# Patient Record
Sex: MALE | Race: WHITE | Employment: STUDENT
[De-identification: names, ages, dates, MRNs, and addresses within clinical notes are randomized per-mention and may not be internally consistent; named-entity substitution may affect disease eponyms.]

---

## 2024-07-02 ENCOUNTER — TELEPHONE (OUTPATIENT)
Facility: HOSPITAL | Age: 59
End: 2024-07-02

## 2024-07-03 ENCOUNTER — HOSPITAL ENCOUNTER (OUTPATIENT)
Facility: HOSPITAL | Age: 59
Setting detail: RECURRING SERIES
Discharge: HOME | End: 2024-07-06
Payer: COMMERCIAL

## 2024-07-03 PROCEDURE — 97161 PT EVAL LOW COMPLEX 20 MIN: CPT

## 2024-07-03 NOTE — PROGRESS NOTES
[facility] at [facility]  [address] [address]  Phone: [phone]   Fax: [phone]    Plan of Care/ Statement of Necessity for Physical Therapy Services    Patient name: Francesco Wren Start of Care: 7/3/2024   Referral source: Luciano Hurd MD : 1965    Medical Diagnosis: Cervicalgia [M54.2]      Onset Date:2024    Treatment Diagnosis:  M54.2  NECK PAIN                                          Prior Hospitalization: see medical history Provider#: [id number]   Medications: Verified on Patient Summary List     Comorbidities: cardiac sarcoidosis, heart disease. Surgical Hx: right knee x3, left knee x3, right reconstruction, left frozen shoulder manipulation.    PLOF: resistance exercise routine, basketball, independent with ADL's.        The Plan of Care and following information is based on the information from the initial evaluation.  Assessment/ key information: Patient has CC of left shoulder and cervical pain since 2024. Cortizone injection was received in left upper trap 2024 and patient experienced notable relief of his pain. Patient had a pacemaker placed 2024 secondary to cardiac sarcoidosis and a previous history of left frozen shoulder that was manipulated under anesthesia. Patient currently exhibits moderate pain in the left upper/mid trap, lats and levator scapulae; decreased strength in cervical and left UE; decreased cervical AROM that is making driving difficult and he has a reduced ability to lift objects to perform home maintenance.     Patient will  benefit from skilled PT services to modify and progress therapeutic interventions, address functional mobility deficits, address ROM deficits, address strength deficits, analyze and address soft tissue restrictions, analyze and cue movement patterns, analyze and modify body mechanics/ergonomics and assess and modify postural abnormalities to attain his goals.    Evaluation Complexity

## 2024-07-03 NOTE — PROGRESS NOTES
PT DAILY TREATMENT NOTE/CERVICAL [id number]    Patient Name: Francesco Wren  Date:7/3/2024  : 1965  [x]  Patient  Verified  Payor: PRISCILLA / Plan: REYCARMEN POS / Product Type: *No Product type* /    In time:1330  Out time:1418  Total Treatment Time (min): 48  Visit #: 1 of 16    Treatment Area: Cervicalgia [M54.2]    SUBJECTIVE  Pain Level (0-10 scale): 3-7/10  [x]constant []intermittent []improving [x]worsening []no change since onset    Any medication changes, allergies to medications, adverse drug reactions, diagnosis change, or new procedure performed?: [x] No    [] Yes (see summary sheet for update)  Subjective functional status/changes:     Patient has CC of left shoulder and cervical pain since 2024. Cortizone injection was received in left upper trap 2024 and patient experienced notable relief of his pain. Patient had a pacemaker placed 2024 secondary to cardiac sarcoidosis and a previous history of left frozen shoulder that was manipulated under anesthesia. Patient describes pain as aggravating/annoying. Denies numbness/tingling. Denies popping/clicking. Aggravating factors:lifting objects, left cervical rotation. Alleviating factors: advil.  Denies red flags: SOB, chest pain, dizziness/lightheadedness, blurred/double vision, HA, chills/fevers, night sweats, change in bowel/bladder control, abdominal pain, difficulty swallowing, slurred speech, unexplained weight gain/loss, nausea, vomiting. PMHx: heart disease. Surgical Hx: right knee x3, left knee x3, right reconstruction, left frozen shoulder manipulation. Social Hx: lives with wife and pets; consumes alcohol socially, denies tobacco use, work status: teacher. PLOF: resistance exercise routine, basketball, independent with ADL's. CLOF: unable to lift objects heavier than 10 pounds w/o pain, decreased cervical AROM makes driving and merging into traffic difficult.  Diagnostic Imaging: xray- arthritis in cervical

## 2024-07-23 ENCOUNTER — HOSPITAL ENCOUNTER (OUTPATIENT)
Facility: HOSPITAL | Age: 59
Setting detail: RECURRING SERIES
Discharge: HOME | End: 2024-07-26
Payer: COMMERCIAL

## 2024-07-23 PROCEDURE — 97110 THERAPEUTIC EXERCISES: CPT

## 2024-07-23 PROCEDURE — 97530 THERAPEUTIC ACTIVITIES: CPT

## 2024-07-23 PROCEDURE — 97112 NEUROMUSCULAR REEDUCATION: CPT

## 2024-07-23 NOTE — PROGRESS NOTES
throughout to aid in completion of ADLs.                 Status at IE:4/5 in all planes of cervical and UE                 Current: Same as IE                    Patient will report pain no greater than 1-2/10 throughout entire day to aid in completion of ADLs.                 Status at IE:3-7/10                 Current: Same as IE                    Patent will be able to lift 10lbs pain free overhead to be able to return to full work duties.                 Status at IE:pain with lifting objects overhead                 Current: Same as IE                    Patient will improve Neck Disability Index score to 0% to demonstrate improvement in functional status.                 Status at IE:9%                 Current: Same as IE    PLAN  Yes  Continue plan of care  []  Upgrade activities as tolerated  []  Discharge due to :  []  Other:    Donovan Gordon PT    7/23/2024    8:18 AM    Future Appointments   Date Time Provider Department Center   7/25/2024  8:10 AM Donovan Gordon, PT [facility]   7/29/2024  8:50 AM Donovan Gordon, PT [facility]   7/31/2024  8:50 AM Donovan Gordon, PT [facility]

## 2024-07-25 ENCOUNTER — HOSPITAL ENCOUNTER (OUTPATIENT)
Facility: HOSPITAL | Age: 59
Setting detail: RECURRING SERIES
Discharge: HOME | End: 2024-07-28
Payer: COMMERCIAL

## 2024-07-25 PROCEDURE — 97110 THERAPEUTIC EXERCISES: CPT

## 2024-07-25 PROCEDURE — 97530 THERAPEUTIC ACTIVITIES: CPT

## 2024-07-25 PROCEDURE — 97112 NEUROMUSCULAR REEDUCATION: CPT

## 2024-07-25 NOTE — PROGRESS NOTES
Term Goals: To be accomplished in 8 weeks:                 Patient will increase Cooper UE strength to 4/5 MMT throughout to aid in completion of ADLs.                 Status at IE:4/5 in all planes of cervical and UE                 Current: Same as IE                    Patient will report pain no greater than 1-2/10 throughout entire day to aid in completion of ADLs.                 Status at IE:3-7/10                 Current: Same as IE                    Patent will be able to lift 10lbs pain free overhead to be able to return to full work duties.                 Status at IE:pain with lifting objects overhead                 Current: Same as IE                    Patient will improve Neck Disability Index score to 0% to demonstrate improvement in functional status.                 Status at IE:9%                 Current: Same as IE    PLAN  Yes  Continue plan of care  []  Upgrade activities as tolerated  []  Discharge due to :  []  Other:    Donovan Gordon PT    7/25/2024    8:51 AM    Future Appointments   Date Time Provider Department Center   7/29/2024  8:50 AM Donovan Gordon, PT [facility]   7/31/2024  8:50 AM Donovan Gordon, PT [facility]

## 2024-07-29 ENCOUNTER — HOSPITAL ENCOUNTER (OUTPATIENT)
Facility: HOSPITAL | Age: 59
Setting detail: RECURRING SERIES
Discharge: HOME | End: 2024-08-01
Payer: COMMERCIAL

## 2024-07-29 PROCEDURE — 97530 THERAPEUTIC ACTIVITIES: CPT

## 2024-07-29 PROCEDURE — 97110 THERAPEUTIC EXERCISES: CPT

## 2024-07-29 PROCEDURE — 97112 NEUROMUSCULAR REEDUCATION: CPT

## 2024-07-29 NOTE — PROGRESS NOTES
PHYSICAL / OCCUPATIONAL THERAPY - DAILY TREATMENT NOTE     Patient Name: Francesco Wren    Date: 2024    : 1965  Insurance: Payor: REYCARMEN / Plan: REYCARMEN POS / Product Type: *No Product type* /      Patient  verified Yes     Visit #   Current / Total 4 16   Time   In / Out 0850 0935   Pain   In / Out 0 0   Subjective Functional Status/Changes: \"I feel tight in my left scapula.\"   Changes to:  Allergies, Med Hx, Sx Hx?   no       TREATMENT AREA =  Cervicalgia [M54.2]    If an interpreting service is utilized for treatment of this patient, the contents of this document represent the material reviewed with the patient via the .     OBJECTIVE    Therapeutic Procedures:  Tx Min Billable or 1:1 Min (if diff from Tx Min) Procedure, Rationale, Specifics   25  24416 Therapeutic Exercise (timed):  increase ROM, strength, coordination, balance, and proprioception to improve patient's ability to progress to PLOF and address remaining functional goals. (see flow sheet as applicable)    Details if applicable:       10  88569 Neuromuscular Re-Education (timed):  improve balance, coordination, kinesthetic sense, posture, core stability and proprioception to improve patient's ability to develop conscious control of individual muscles and awareness of position of extremities in order to progress to PLOF and address remaining functional goals. (see flow sheet as applicable)    Details if applicable:     10  49353 Therapeutic Activity (timed):  use of dynamic activities replicating functional movements to increase ROM, strength, coordination, balance, and proprioception in order to improve patient's ability to progress to PLOF and address remaining functional goals.  (see flow sheet as applicable)     Details if applicable:           Details if applicable:            Details if applicable:     45  [facility] Totals Reminder: bill using total billable min of TIMED therapeutic procedures (example: do not include

## 2024-07-31 ENCOUNTER — HOSPITAL ENCOUNTER (OUTPATIENT)
Facility: HOSPITAL | Age: 59
Setting detail: RECURRING SERIES
Discharge: HOME | End: 2024-08-03
Payer: COMMERCIAL

## 2024-07-31 PROCEDURE — 97140 MANUAL THERAPY 1/> REGIONS: CPT

## 2024-07-31 PROCEDURE — 97112 NEUROMUSCULAR REEDUCATION: CPT

## 2024-07-31 PROCEDURE — 97110 THERAPEUTIC EXERCISES: CPT

## 2024-07-31 PROCEDURE — 97530 THERAPEUTIC ACTIVITIES: CPT

## 2024-07-31 NOTE — PROGRESS NOTES
[facility]  [address] [address]  Phone: [phone]   Fax: [phone]    Progress Note  Patient name: Francesco Wren Start of Care: 7/3/2024    Referral source: Luciano Hurd MD : 1965   Medical/Treatment Diagnosis: Cervicalgia [M54.2] Onset Date:2024      Prior Hospitalization: see medical history Provider#: [id number]   Medications: Verified on Patient Summary List    Comorbidities:  cardiac sarcoidosis, heart disease. Surgical Hx: right knee x3, left knee x3, right reconstruction, left frozen shoulder manipulation.    Prior Level of Function:resistance exercise routine, basketball, independent with ADL's.     Visits from Start of Care: 5        Updated Goals/Measure of Progress:     Short Term Goals: To be accomplished in 4 weeks:                 Patient will report compliance with HEP 1x/day to aid in rehabilitation program.                 Status at Ie: Patient instructed in and provided written copy of initial Home Exercise Program.                 Current:In-progress, developing consistency, 2024                    Patient will increase cervical AROM to 80 degrees of left rotation to aid in completion of ADLs.                 Status at IE:50 degrees                 Current: In-progress, 60 degrees, 2024     Long Term Goals: To be accomplished in 8 weeks:                 Patient will increase Cooper UE strength to 5/5 MMT throughout to aid in completion of ADLs. Goal modified 2024                 Status at IE:4/5 in all planes of cervical and UE                 Current: In-progress, 4/5 globally, 2024                    Patient will report pain no greater than 1-2/10 throughout entire day to aid in completion of ADLs.                 Status at IE:3-7/10                 Current:  In-progress, 0-310, 2024                    Patent will be able to lift 10lbs pain free overhead to be able to return to full work duties.

## 2024-07-31 NOTE — PROGRESS NOTES
PHYSICAL / OCCUPATIONAL THERAPY - DAILY TREATMENT NOTE     Patient Name: Francesco Wren    Date: 2024    : 1965  Insurance: Payor: REYCARMEN / Plan: REYCARMEN POS / Product Type: *No Product type* /      Patient  verified Yes     Visit #   Current / Total 5 16   Time   In / Out 0850 0935   Pain   In / Out 0 0   Subjective Functional Status/Changes: \"The tightness in my scapula has been there for years.\"   Changes to:  Allergies, Med Hx, Sx Hx?   no       TREATMENT AREA =  Cervicalgia [M54.2]    If an interpreting service is utilized for treatment of this patient, the contents of this document represent the material reviewed with the patient via the .     OBJECTIVE    Therapeutic Procedures:  Tx Min Billable or 1:1 Min (if diff from Tx Min) Procedure, Rationale, Specifics   20  61666 Therapeutic Exercise (timed):  increase ROM, strength, coordination, balance, and proprioception to improve patient's ability to progress to PLOF and address remaining functional goals. (see flow sheet as applicable)    Details if applicable:       13  60746 Neuromuscular Re-Education (timed):  improve balance, coordination, kinesthetic sense, posture, core stability and proprioception to improve patient's ability to develop conscious control of individual muscles and awareness of position of extremities in order to progress to PLOF and address remaining functional goals. (see flow sheet as applicable)    Details if applicable:     12  77918 Therapeutic Activity (timed):  use of dynamic activities replicating functional movements to increase ROM, strength, coordination, balance, and proprioception in order to improve patient's ability to progress to PLOF and address remaining functional goals.  (see flow sheet as applicable)     Details if applicable:     10    25527 Manual Therapy (timed):  decrease pain, increase ROM, increase tissue extensibility, decrease trigger points, and increase postural awareness to

## 2024-08-05 ENCOUNTER — HOSPITAL ENCOUNTER (OUTPATIENT)
Facility: HOSPITAL | Age: 59
Setting detail: RECURRING SERIES
Discharge: HOME | End: 2024-08-08
Payer: COMMERCIAL

## 2024-08-05 ENCOUNTER — TELEPHONE (OUTPATIENT)
Facility: HOSPITAL | Age: 59
End: 2024-08-05

## 2024-08-05 PROCEDURE — 97112 NEUROMUSCULAR REEDUCATION: CPT

## 2024-08-05 PROCEDURE — 97110 THERAPEUTIC EXERCISES: CPT

## 2024-08-05 PROCEDURE — 97530 THERAPEUTIC ACTIVITIES: CPT

## 2024-08-05 NOTE — TELEPHONE ENCOUNTER
Called to check in as it was 5+ min after appt & pt thought appt was Tues. Offered 9:30 slot & pt said he could come then.

## 2024-08-05 NOTE — PROGRESS NOTES
PHYSICAL / OCCUPATIONAL THERAPY - DAILY TREATMENT NOTE     Patient Name: Francesco Wren    Date: 2024    : 1965  Insurance: Payor: REYCARMEN / Plan: REYCARMEN POS / Product Type: *No Product type* /      Patient  verified Yes     Visit #   Current / Total 6 16   Time   In / Out 0930 1010   Pain   In / Out 0 0   Subjective Functional Status/Changes: Pt reports he received cortisone shot last Friday morning to help with neck pain    Changes to:  Allergies, Med Hx, Sx Hx?   no       TREATMENT AREA =  Cervicalgia [M54.2]    If an interpreting service is utilized for treatment of this patient, the contents of this document represent the material reviewed with the patient via the .     OBJECTIVE      Therapeutic Procedures:  Tx Min Billable or 1:1 Min (if diff from Tx Min) Procedure, Rationale, Specifics   23  69627 Therapeutic Exercise (timed):  increase ROM, strength, coordination, balance, and proprioception to improve patient's ability to progress to PLOF and address remaining functional goals. (see flow sheet as applicable)    Details if applicable:       9  51719 Neuromuscular Re-Education (timed):  improve balance, coordination, kinesthetic sense, posture, core stability and proprioception to improve patient's ability to develop conscious control of individual muscles and awareness of position of extremities in order to progress to PLOF and address remaining functional goals. (see flow sheet as applicable)    Details if applicable:     8  13495 Therapeutic Activity (timed):  use of dynamic activities replicating functional movements to increase ROM, strength, coordination, balance, and proprioception in order to improve patient's ability to progress to PLOF and address remaining functional goals.  (see flow sheet as applicable)     Details if applicable:           Details if applicable:            Details if applicable:     40  [facility] Totals Reminder: bill using total billable min of TIMED

## 2024-08-07 ENCOUNTER — HOSPITAL ENCOUNTER (OUTPATIENT)
Facility: HOSPITAL | Age: 59
Setting detail: RECURRING SERIES
Discharge: HOME | End: 2024-08-10
Payer: COMMERCIAL

## 2024-08-07 PROCEDURE — 97530 THERAPEUTIC ACTIVITIES: CPT

## 2024-08-07 PROCEDURE — 97112 NEUROMUSCULAR REEDUCATION: CPT

## 2024-08-07 PROCEDURE — 97110 THERAPEUTIC EXERCISES: CPT

## 2024-08-07 NOTE — PROGRESS NOTES
completion of ADLs.                 Status at IE:50 degrees                 Current: In-progress, 60 degrees, 7/25/2024     Long Term Goals: To be accomplished in 8 weeks:                 Patient will increase Cooper UE strength to 5/5 MMT throughout to aid in completion of ADLs. Goal modified 7/31/2024                 Status at IE:4/5 in all planes of cervical and UE                 Current: In-progress, 4/5 globally, 7/31/2024                    Patient will report pain no greater than 1-2/10 throughout entire day to aid in completion of ADLs.                 Status at IE:3-7/10                 Current: 0-4/10 due to lifting heavier objects over the weekend 8/5/24                    Patent will be able to lift 10lbs pain free overhead to be able to return to full work duties.                 Status at IE:pain with lifting objects overhead                 Current: Increased resistance with wall slides/thread needle to improve activity tolerance with overhead tasks 8/7/24                    Patient will improve Neck Disability Index score to 9% to demonstrate improvement in functional status.                 Status at IE:18%                 Current: In-progress, 14%, 7/31/2024    PLAN  Yes  Continue plan of care  []  Upgrade activities as tolerated  []  Discharge due to :  []  Other:    Diego Diego PTA    8/7/2024    7:55 AM    Future Appointments   Date Time Provider Department Center   8/7/2024  8:50 AM Diego Diego PTA [facility]

## 2024-08-14 ENCOUNTER — HOSPITAL ENCOUNTER (OUTPATIENT)
Facility: HOSPITAL | Age: 59
Setting detail: RECURRING SERIES
Discharge: HOME | End: 2024-08-17
Payer: COMMERCIAL

## 2024-08-14 PROCEDURE — 97110 THERAPEUTIC EXERCISES: CPT

## 2024-08-14 PROCEDURE — 97112 NEUROMUSCULAR REEDUCATION: CPT

## 2024-08-14 PROCEDURE — 97140 MANUAL THERAPY 1/> REGIONS: CPT

## 2024-08-14 PROCEDURE — 97530 THERAPEUTIC ACTIVITIES: CPT

## 2024-08-14 NOTE — PROGRESS NOTES
PHYSICAL / OCCUPATIONAL THERAPY - DAILY TREATMENT NOTE     Patient Name: Francesco Wren    Date: 2024    : 1965  Insurance: Payor: PRISCILLA / Plan: REYCARMEN POS / Product Type: *No Product type* /      Patient  verified Yes     Visit #   Current / Total 8 16   Time   In / Out 1530 1615   Pain   In / Out 0 0   Subjective Functional Status/Changes: \"I woke up with a cramp in my neck.\"   Changes to:  Allergies, Med Hx, Sx Hx?   no       TREATMENT AREA =  Cervicalgia [M54.2]    If an interpreting service is utilized for treatment of this patient, the contents of this document represent the material reviewed with the patient via the .     OBJECTIVE    Therapeutic Procedures:  Tx Min Billable or 1:1 Min (if diff from Tx Min) Procedure, Rationale, Specifics   20  88314 Therapeutic Exercise (timed):  increase ROM, strength, coordination, balance, and proprioception to improve patient's ability to progress to PLOF and address remaining functional goals. (see flow sheet as applicable)    Details if applicable:       9  58139 Neuromuscular Re-Education (timed):  improve balance, coordination, kinesthetic sense, posture, core stability and proprioception to improve patient's ability to develop conscious control of individual muscles and awareness of position of extremities in order to progress to PLOF and address remaining functional goals. (see flow sheet as applicable)    Details if applicable:     8  46340 Therapeutic Activity (timed):  use of dynamic activities replicating functional movements to increase ROM, strength, coordination, balance, and proprioception in order to improve patient's ability to progress to PLOF and address remaining functional goals.  (see flow sheet as applicable)     Details if applicable:     8  62050 Manual Therapy (timed):  decrease pain, increase ROM, increase tissue extensibility, decrease trigger points, and increase postural awareness to improve patient's ability to

## 2024-08-16 ENCOUNTER — HOSPITAL ENCOUNTER (OUTPATIENT)
Facility: HOSPITAL | Age: 59
Setting detail: RECURRING SERIES
End: 2024-08-16
Payer: COMMERCIAL

## 2024-08-16 ENCOUNTER — TELEPHONE (OUTPATIENT)
Facility: HOSPITAL | Age: 59
End: 2024-08-16

## 2024-08-20 ENCOUNTER — HOSPITAL ENCOUNTER (OUTPATIENT)
Facility: HOSPITAL | Age: 59
Setting detail: RECURRING SERIES
Discharge: HOME | End: 2024-08-23
Payer: COMMERCIAL

## 2024-08-20 PROCEDURE — 97112 NEUROMUSCULAR REEDUCATION: CPT

## 2024-08-20 PROCEDURE — 97530 THERAPEUTIC ACTIVITIES: CPT

## 2024-08-20 PROCEDURE — 97110 THERAPEUTIC EXERCISES: CPT

## 2024-08-20 PROCEDURE — 97140 MANUAL THERAPY 1/> REGIONS: CPT

## 2024-08-20 NOTE — PROGRESS NOTES
PHYSICAL / OCCUPATIONAL THERAPY - DAILY TREATMENT NOTE     Patient Name: Francesco Wren    Date: 2024    : 1965  Insurance: Payor: REYCARMEN / Plan: REYCARMEN POS / Product Type: *No Product type* /      Patient  verified Yes     Visit #   Current / Total 9 16   Time   In / Out 1530 1620   Pain   In / Out 0 0   Subjective Functional Status/Changes: \"My neck has less pain today.\"   Changes to:  Allergies, Med Hx, Sx Hx?   no       TREATMENT AREA =  Cervicalgia [M54.2]    If an interpreting service is utilized for treatment of this patient, the contents of this document represent the material reviewed with the patient via the .     OBJECTIVE    Therapeutic Procedures:  Tx Min Billable or 1:1 Min (if diff from Tx Min) Procedure, Rationale, Specifics   20  20280 Therapeutic Exercise (timed):  increase ROM, strength, coordination, balance, and proprioception to improve patient's ability to progress to PLOF and address remaining functional goals. (see flow sheet as applicable)    Details if applicable:       8  20075 Neuromuscular Re-Education (timed):  improve balance, coordination, kinesthetic sense, posture, core stability and proprioception to improve patient's ability to develop conscious control of individual muscles and awareness of position of extremities in order to progress to PLOF and address remaining functional goals. (see flow sheet as applicable)    Details if applicable:     10  42526 Therapeutic Activity (timed):  use of dynamic activities replicating functional movements to increase ROM, strength, coordination, balance, and proprioception in order to improve patient's ability to progress to PLOF and address remaining functional goals.  (see flow sheet as applicable)     Details if applicable:     12  59472 Manual Therapy (timed):  decrease pain, increase ROM, increase tissue extensibility, decrease trigger points, and increase postural awareness to improve patient's ability to

## 2024-08-22 ENCOUNTER — APPOINTMENT (OUTPATIENT)
Facility: HOSPITAL | Age: 59
End: 2024-08-22
Payer: COMMERCIAL

## 2024-08-28 ENCOUNTER — TELEPHONE (OUTPATIENT)
Facility: HOSPITAL | Age: 59
End: 2024-08-28

## 2024-09-03 ENCOUNTER — HOSPITAL ENCOUNTER (OUTPATIENT)
Facility: HOSPITAL | Age: 59
Setting detail: RECURRING SERIES
Discharge: HOME | End: 2024-09-06
Payer: COMMERCIAL

## 2024-09-03 PROCEDURE — 97112 NEUROMUSCULAR REEDUCATION: CPT

## 2024-09-03 PROCEDURE — 97110 THERAPEUTIC EXERCISES: CPT

## 2024-09-03 PROCEDURE — 97140 MANUAL THERAPY 1/> REGIONS: CPT

## 2024-09-03 PROCEDURE — 97530 THERAPEUTIC ACTIVITIES: CPT

## 2024-09-04 NOTE — PROGRESS NOTES
PHYSICAL / OCCUPATIONAL THERAPY - DAILY TREATMENT NOTE     Patient Name: Francesco Wren    Date: 9/3/2024    : 1965  Insurance: Payor: PRISCILLA / Plan: REYCARMEN POS / Product Type: *No Product type* /      Patient  verified Yes     Visit #   Current / Total 10 16   Time   In / Out 4:10 pm 4:55 pm   Pain   In / Out 0 0   Subjective Functional Status/Changes: Pt reports his neck is doing better. Notes he does still get some pain along his left scapula when doing yardwork, but no longer has the constant pain he used to.   Changes to:  Allergies, Med Hx, Sx Hx?   no       TREATMENT AREA =  Cervicalgia [M54.2]    If an interpreting service is utilized for treatment of this patient, the contents of this document represent the material reviewed with the patient via the .     OBJECTIVE    Therapeutic Procedures:  Tx Min Billable or 1:1 Min (if diff from Tx Min) Procedure, Rationale, Specifics   19  74573 Therapeutic Exercise (timed):  increase ROM, strength, coordination, balance, and proprioception to improve patient's ability to progress to PLOF and address remaining functional goals. (see flow sheet as applicable)    Details if applicable:       8  90433 Neuromuscular Re-Education (timed):  improve balance, coordination, kinesthetic sense, posture, core stability and proprioception to improve patient's ability to develop conscious control of individual muscles and awareness of position of extremities in order to progress to PLOF and address remaining functional goals. (see flow sheet as applicable)    Details if applicable:     8  64281 Therapeutic Activity (timed):  use of dynamic activities replicating functional movements to increase ROM, strength, coordination, balance, and proprioception in order to improve patient's ability to progress to PLOF and address remaining functional goals.  (see flow sheet as applicable)     Details if applicable:     10  86430 Manual Therapy (timed):  decrease pain, 
Physical Therapy Discharge Instructions    [facility]  [address] [address]  Phone: [phone]   Fax: [phone]      Patient: Francesco Wren  : 1965    Continue Home Exercise Program 1 times per day for 4 weeks      Continue with    [x] Ice  as needed      [x] Heat           Follow up with MD:     [] Upon completion of therapy     [x] As needed      Recommendations:     [x]   Return to activity with home program    []   Return to activity with the following modifications:       []Post Rehab Program    []Join Independent aquatic program     []Return to/join local gym      Additional Comments: Please contact clinic at above phone number if you have any questions regarding Home Exercise Program or self care instructions.    
IE:3-7/10                 Current: 4 at most with yardwork sometimes, otherwise 0 typically 9/3/24                    Patent will be able to lift 10lbs pain free overhead to be able to return to full work duties.                 Status at IE:pain with lifting objects overhead                 Current: Met,  9/3/24 able to perform with dumbbells in clinic void of adverse response                    Patient will improve Neck Disability Index score to 9% to demonstrate improvement in functional status.                 Status at IE:18%                 Current: In-progress, 14%, 7/31/2024 near met 6%, limited by low intake score 9/3/24    Assessment/ Summary of Care: Pt has attended 10 PT appts to date demonstrating improvements in cervical mobility and loading tolerance with subjective reports of near abolishment of pain with left sided scapular pain only with vigorous activity per pt. At this time he will be DC from skilled care with instructions for continued HEP. DC from skilled PT.     RECOMMENDATIONS:  [x]Discontinue therapy: [x]Patient has reached or is progressing toward set goals      []Patient is non-compliant or has abdicated      []Due to lack of appreciable progress towards set goals    Zac Kiran, PT 9/4/2024 11:52 AM

## 2024-09-05 ENCOUNTER — APPOINTMENT (OUTPATIENT)
Facility: HOSPITAL | Age: 59
End: 2024-09-05
Payer: COMMERCIAL